# Patient Record
Sex: MALE | Race: WHITE | NOT HISPANIC OR LATINO | Employment: OTHER | URBAN - METROPOLITAN AREA
[De-identification: names, ages, dates, MRNs, and addresses within clinical notes are randomized per-mention and may not be internally consistent; named-entity substitution may affect disease eponyms.]

---

## 2020-02-26 ENCOUNTER — LAB REQUISITION (OUTPATIENT)
Dept: LAB | Facility: HOSPITAL | Age: 75
End: 2020-02-26
Payer: MEDICARE

## 2020-02-26 DIAGNOSIS — K22.70 BARRETT'S ESOPHAGUS WITHOUT DYSPLASIA: ICD-10-CM

## 2020-02-26 PROCEDURE — 88305 TISSUE EXAM BY PATHOLOGIST: CPT | Performed by: PATHOLOGY

## 2021-10-04 ENCOUNTER — OFFICE VISIT (OUTPATIENT)
Dept: URBAN - METROPOLITAN AREA CLINIC 48 | Facility: CLINIC | Age: 76
End: 2021-10-04

## 2021-10-04 ENCOUNTER — OFFICE VISIT (OUTPATIENT)
Dept: URBAN - METROPOLITAN AREA CLINIC 48 | Facility: CLINIC | Age: 76
End: 2021-10-04
Payer: MEDICARE

## 2021-10-04 ENCOUNTER — DASHBOARD ENCOUNTERS (OUTPATIENT)
Age: 76
End: 2021-10-04

## 2021-10-04 ENCOUNTER — LAB OUTSIDE AN ENCOUNTER (OUTPATIENT)
Dept: URBAN - METROPOLITAN AREA CLINIC 48 | Facility: CLINIC | Age: 76
End: 2021-10-04

## 2021-10-04 VITALS
SYSTOLIC BLOOD PRESSURE: 166 MMHG | TEMPERATURE: 97.9 F | HEIGHT: 71 IN | BODY MASS INDEX: 28 KG/M2 | HEART RATE: 57 BPM | DIASTOLIC BLOOD PRESSURE: 77 MMHG | WEIGHT: 200 LBS

## 2021-10-04 DIAGNOSIS — R14.3 FLATULENCE: ICD-10-CM

## 2021-10-04 DIAGNOSIS — R13.13 PHARYNGEAL DYSPHAGIA: ICD-10-CM

## 2021-10-04 DIAGNOSIS — Z12.11 COLON CANCER SCREENING: ICD-10-CM

## 2021-10-04 PROBLEM — 21101000119105: Status: ACTIVE | Noted: 2021-10-04

## 2021-10-04 PROCEDURE — 99204 OFFICE O/P NEW MOD 45 MIN: CPT | Performed by: NURSE PRACTITIONER

## 2021-10-04 RX ORDER — LOSARTAN POTASSIUM AND HYDROCHLOROTHIAZIDE 100; 12.5 MG/1; MG/1
1 TABLET TABLET, FILM COATED ORAL ONCE A DAY
Status: ACTIVE | COMMUNITY

## 2021-10-04 RX ORDER — TRAZODONE HYDROCHLORIDE 50 MG/1
1 TABLET AT BEDTIME AS NEEDED TABLET, FILM COATED ORAL ONCE A DAY
Status: ACTIVE | COMMUNITY

## 2021-10-04 RX ORDER — FENOFIBRATE 160 MG/1
1 TABLET TABLET ORAL ONCE A DAY
Status: ACTIVE | COMMUNITY

## 2021-10-04 RX ORDER — ALLOPURINOL 300 MG/1
1 TABLET TABLET ORAL ONCE A DAY
Status: ACTIVE | COMMUNITY

## 2021-10-04 RX ORDER — HYDRALAZINE HYDROCHLORIDE 25 MG/1
1 TABLET WITH FOOD TABLET, FILM COATED ORAL THREE TIMES A DAY
Status: ACTIVE | COMMUNITY

## 2021-10-04 RX ORDER — METOPROLOL TARTRATE 25 MG/1
1 TABLET WITH FOOD TABLET, FILM COATED ORAL TWICE A DAY
Status: ACTIVE | COMMUNITY

## 2021-10-04 RX ORDER — OMEPRAZOLE 20 MG/1
1 CAPSULE 30 MINUTES BEFORE MORNING MEAL CAPSULE, DELAYED RELEASE ORAL ONCE A DAY
Status: ACTIVE | COMMUNITY

## 2021-10-04 RX ORDER — FLUOXETINE HYDROCHLORIDE 20 MG/1
1 CAPSULE CAPSULE ORAL ONCE A DAY
Status: ACTIVE | COMMUNITY

## 2021-10-04 NOTE — HPI-TODAY'S VISIT:
The patient was referred by Dr. Pascual for evaluation of dysphagia .  A copy of this document is being forwarded to the referring provider. The patient has had 20 years of intermittent dysphagia to solids. Denies abdominal or chest pain. At the time of choking, he has to stop and rest and try to drink water. He often cannot get water down. Toast and rice are the worst, He has had the himleck manuever once. The patient denies GERD but his PCP started him on Omeprazole 20mg. He has not noted any improvement.  Notes having increased gas in the last few years. Bowel movements are daily of soft, formed stool 3 times a day but denies blood in stool. His last colonoscopy was in 1976 and he was told it was normal. He is due for an US of the kidneys due to decrease kidney function.

## 2021-10-19 ENCOUNTER — CLAIMS CREATED FROM THE CLAIM WINDOW (OUTPATIENT)
Dept: URBAN - METROPOLITAN AREA SURGERY CENTER 27 | Facility: SURGERY CENTER | Age: 76
End: 2021-10-19
Payer: MEDICARE

## 2021-10-19 DIAGNOSIS — Q39.9 CONGENITAL MALFORMATION OF ESOPHAGUS, UNSPECIFIED: ICD-10-CM

## 2021-10-19 DIAGNOSIS — R13.10 ABNORMAL DEGLUTITION: ICD-10-CM

## 2021-10-19 PROCEDURE — G8907 PT DOC NO EVENTS ON DISCHARG: HCPCS | Performed by: INTERNAL MEDICINE

## 2021-10-19 PROCEDURE — 43248 EGD GUIDE WIRE INSERTION: CPT | Performed by: INTERNAL MEDICINE

## 2021-10-19 RX ORDER — LOSARTAN POTASSIUM AND HYDROCHLOROTHIAZIDE 100; 12.5 MG/1; MG/1
1 TABLET TABLET, FILM COATED ORAL ONCE A DAY
Status: ACTIVE | COMMUNITY

## 2021-10-19 RX ORDER — FENOFIBRATE 160 MG/1
1 TABLET TABLET ORAL ONCE A DAY
Status: ACTIVE | COMMUNITY

## 2021-10-19 RX ORDER — ALLOPURINOL 300 MG/1
1 TABLET TABLET ORAL ONCE A DAY
Status: ACTIVE | COMMUNITY

## 2021-10-19 RX ORDER — METOPROLOL TARTRATE 25 MG/1
1 TABLET WITH FOOD TABLET, FILM COATED ORAL TWICE A DAY
Status: ACTIVE | COMMUNITY

## 2021-10-19 RX ORDER — TRAZODONE HYDROCHLORIDE 50 MG/1
1 TABLET AT BEDTIME AS NEEDED TABLET, FILM COATED ORAL ONCE A DAY
Status: ACTIVE | COMMUNITY

## 2021-10-19 RX ORDER — OMEPRAZOLE 20 MG/1
1 CAPSULE 30 MINUTES BEFORE MORNING MEAL CAPSULE, DELAYED RELEASE ORAL ONCE A DAY
Status: ACTIVE | COMMUNITY

## 2021-10-19 RX ORDER — FLUOXETINE HYDROCHLORIDE 20 MG/1
1 CAPSULE CAPSULE ORAL ONCE A DAY
Status: ACTIVE | COMMUNITY

## 2021-10-19 RX ORDER — HYDRALAZINE HYDROCHLORIDE 25 MG/1
1 TABLET WITH FOOD TABLET, FILM COATED ORAL THREE TIMES A DAY
Status: ACTIVE | COMMUNITY

## 2022-10-18 PROCEDURE — 88305 TISSUE EXAM BY PATHOLOGIST: CPT | Performed by: PATHOLOGY

## 2022-10-19 ENCOUNTER — LAB REQUISITION (OUTPATIENT)
Dept: LAB | Facility: HOSPITAL | Age: 77
End: 2022-10-19
Payer: MEDICARE

## 2022-10-19 DIAGNOSIS — K22.70 BARRETT'S ESOPHAGUS WITHOUT DYSPLASIA: ICD-10-CM

## 2022-10-19 DIAGNOSIS — K31.7 POLYP OF STOMACH AND DUODENUM: ICD-10-CM

## 2022-10-27 PROCEDURE — 88305 TISSUE EXAM BY PATHOLOGIST: CPT | Performed by: PATHOLOGY

## 2024-12-10 PROBLEM — E78.5 DYSLIPIDEMIA: Status: ACTIVE | Noted: 2018-09-21

## 2024-12-10 PROBLEM — I48.0 PAROXYSMAL ATRIAL FIBRILLATION (HCC): Status: ACTIVE | Noted: 2018-10-30

## 2024-12-10 PROBLEM — H40.9 GLAUCOMA: Status: ACTIVE | Noted: 2024-12-10

## 2024-12-10 PROBLEM — Z86.0100 HISTORY OF COLONIC POLYPS: Status: ACTIVE | Noted: 2024-12-10

## 2024-12-10 PROBLEM — E78.5 HYPERLIPIDEMIA: Status: ACTIVE | Noted: 2024-12-10

## 2024-12-10 PROBLEM — K22.70 BARRETT'S ESOPHAGUS: Status: ACTIVE | Noted: 2024-12-10

## 2025-05-28 ENCOUNTER — APPOINTMENT (EMERGENCY)
Dept: RADIOLOGY | Facility: HOSPITAL | Age: 80
End: 2025-05-28
Payer: MEDICARE

## 2025-05-28 ENCOUNTER — HOSPITAL ENCOUNTER (EMERGENCY)
Facility: HOSPITAL | Age: 80
Discharge: HOME | End: 2025-05-28
Attending: EMERGENCY MEDICINE | Admitting: EMERGENCY MEDICINE
Payer: MEDICARE

## 2025-05-28 ENCOUNTER — RESULTS FOLLOW-UP (OUTPATIENT)
Dept: EMERGENCY DEPT | Facility: HOSPITAL | Age: 80
End: 2025-05-28

## 2025-05-28 VITALS
DIASTOLIC BLOOD PRESSURE: 73 MMHG | TEMPERATURE: 97.8 F | SYSTOLIC BLOOD PRESSURE: 162 MMHG | OXYGEN SATURATION: 98 % | HEART RATE: 52 BPM | RESPIRATION RATE: 18 BRPM | WEIGHT: 230.82 LBS | BODY MASS INDEX: 32.19 KG/M2

## 2025-05-28 DIAGNOSIS — R03.0 ELEVATED BLOOD PRESSURE READING: ICD-10-CM

## 2025-05-28 DIAGNOSIS — M25.512 LEFT SHOULDER PAIN: ICD-10-CM

## 2025-05-28 DIAGNOSIS — R07.9 CHEST PAIN: Primary | ICD-10-CM

## 2025-05-28 LAB
2HR DELTA HS TROPONIN: -1 NG/L
ALBUMIN SERPL BCG-MCNC: 4.4 G/DL (ref 3.5–5)
ALP SERPL-CCNC: 66 U/L (ref 34–104)
ALT SERPL W P-5'-P-CCNC: 23 U/L (ref 7–52)
ANION GAP SERPL CALCULATED.3IONS-SCNC: 5 MMOL/L (ref 4–13)
AST SERPL W P-5'-P-CCNC: 28 U/L (ref 13–39)
ATRIAL RATE: 52 BPM
ATRIAL RATE: 57 BPM
BASOPHILS # BLD AUTO: 0.02 THOUSANDS/ÂΜL (ref 0–0.1)
BASOPHILS NFR BLD AUTO: 0 % (ref 0–1)
BILIRUB SERPL-MCNC: 0.94 MG/DL (ref 0.2–1)
BUN SERPL-MCNC: 12 MG/DL (ref 5–25)
CALCIUM SERPL-MCNC: 9.3 MG/DL (ref 8.4–10.2)
CARDIAC TROPONIN I PNL SERPL HS: 5 NG/L (ref ?–50)
CARDIAC TROPONIN I PNL SERPL HS: 6 NG/L (ref ?–50)
CHLORIDE SERPL-SCNC: 104 MMOL/L (ref 96–108)
CO2 SERPL-SCNC: 27 MMOL/L (ref 21–32)
CREAT SERPL-MCNC: 0.83 MG/DL (ref 0.6–1.3)
D DIMER PPP FEU-MCNC: <0.27 UG/ML FEU
EOSINOPHIL # BLD AUTO: 0.48 THOUSAND/ÂΜL (ref 0–0.61)
EOSINOPHIL NFR BLD AUTO: 6 % (ref 0–6)
ERYTHROCYTE [DISTWIDTH] IN BLOOD BY AUTOMATED COUNT: 12.3 % (ref 11.6–15.1)
GFR SERPL CREATININE-BSD FRML MDRD: 83 ML/MIN/1.73SQ M
GLUCOSE SERPL-MCNC: 108 MG/DL (ref 65–140)
HCT VFR BLD AUTO: 45.4 % (ref 36.5–49.3)
HGB BLD-MCNC: 15.6 G/DL (ref 12–17)
IMM GRANULOCYTES # BLD AUTO: 0.02 THOUSAND/UL (ref 0–0.2)
IMM GRANULOCYTES NFR BLD AUTO: 0 % (ref 0–2)
LYMPHOCYTES # BLD AUTO: 3.06 THOUSANDS/ÂΜL (ref 0.6–4.47)
LYMPHOCYTES NFR BLD AUTO: 41 % (ref 14–44)
MCH RBC QN AUTO: 30.6 PG (ref 26.8–34.3)
MCHC RBC AUTO-ENTMCNC: 34.4 G/DL (ref 31.4–37.4)
MCV RBC AUTO: 89 FL (ref 82–98)
MONOCYTES # BLD AUTO: 0.7 THOUSAND/ÂΜL (ref 0.17–1.22)
MONOCYTES NFR BLD AUTO: 9 % (ref 4–12)
NEUTROPHILS # BLD AUTO: 3.26 THOUSANDS/ÂΜL (ref 1.85–7.62)
NEUTS SEG NFR BLD AUTO: 44 % (ref 43–75)
NRBC BLD AUTO-RTO: 0 /100 WBCS
P AXIS: 54 DEGREES
P AXIS: 54 DEGREES
PLATELET # BLD AUTO: 206 THOUSANDS/UL (ref 149–390)
PMV BLD AUTO: 10.2 FL (ref 8.9–12.7)
POTASSIUM SERPL-SCNC: 4.3 MMOL/L (ref 3.5–5.3)
PR INTERVAL: 214 MS
PR INTERVAL: 218 MS
PROT SERPL-MCNC: 7.2 G/DL (ref 6.4–8.4)
QRS AXIS: -46 DEGREES
QRS AXIS: -50 DEGREES
QRSD INTERVAL: 96 MS
QRSD INTERVAL: 96 MS
QT INTERVAL: 418 MS
QT INTERVAL: 448 MS
QTC INTERVAL: 406 MS
QTC INTERVAL: 416 MS
RBC # BLD AUTO: 5.1 MILLION/UL (ref 3.88–5.62)
SODIUM SERPL-SCNC: 136 MMOL/L (ref 135–147)
T WAVE AXIS: 60 DEGREES
T WAVE AXIS: 66 DEGREES
VENTRICULAR RATE: 52 BPM
VENTRICULAR RATE: 57 BPM
WBC # BLD AUTO: 7.54 THOUSAND/UL (ref 4.31–10.16)

## 2025-05-28 PROCEDURE — 73030 X-RAY EXAM OF SHOULDER: CPT

## 2025-05-28 PROCEDURE — 85379 FIBRIN DEGRADATION QUANT: CPT | Performed by: EMERGENCY MEDICINE

## 2025-05-28 PROCEDURE — 93005 ELECTROCARDIOGRAM TRACING: CPT

## 2025-05-28 PROCEDURE — 71046 X-RAY EXAM CHEST 2 VIEWS: CPT

## 2025-05-28 PROCEDURE — 80053 COMPREHEN METABOLIC PANEL: CPT | Performed by: EMERGENCY MEDICINE

## 2025-05-28 PROCEDURE — 36415 COLL VENOUS BLD VENIPUNCTURE: CPT | Performed by: EMERGENCY MEDICINE

## 2025-05-28 PROCEDURE — 85025 COMPLETE CBC W/AUTO DIFF WBC: CPT | Performed by: EMERGENCY MEDICINE

## 2025-05-28 PROCEDURE — 84484 ASSAY OF TROPONIN QUANT: CPT | Performed by: EMERGENCY MEDICINE

## 2025-05-28 PROCEDURE — 93010 ELECTROCARDIOGRAM REPORT: CPT | Performed by: INTERNAL MEDICINE

## 2025-05-28 PROCEDURE — 99285 EMERGENCY DEPT VISIT HI MDM: CPT

## 2025-05-28 PROCEDURE — 99285 EMERGENCY DEPT VISIT HI MDM: CPT | Performed by: EMERGENCY MEDICINE

## 2025-05-28 NOTE — DISCHARGE INSTRUCTIONS
Follow up with family doctor    Follow up with Cardiology.   Call [facility] if you would like to switch Cardiology groups    Return to the ED if you have any new/worsening symptoms including but not limited to worsening pain, difficulty breathing, lightheadedness, passing out, etc.

## 2025-05-28 NOTE — ED PROVIDER NOTES
Time reflects when diagnosis was documented in both MDM as applicable and the Disposition within this note       Time User Action Codes Description Comment    5/28/2025  1:08 PM Wei Jaleesa FLACO Add [R07.9] Chest pain     5/28/2025  1:08 PM Wei Jaleesa FLACO Add [R03.0] Elevated blood pressure reading     5/28/2025  1:16 PM Jaleesa Carvajal Add [M25.512] Left shoulder pain           ED Disposition       ED Disposition   Discharge    Condition   Stable    Date/Time   Wed May 28, 2025  1:08 PM    Comment   Barron Munoz discharge to home/self care.                   Assessment & Plan       Medical Decision Making  80 yo M with CP/L shoulder pain- EKG to r/o STEMI/dysrhythmia/abn intervals/ischemic changes, troponin to eval for ischemia, CBC to assess for leukocytosis/anemia, CMP to assess for elevated LFTs/GRIFFIN/electrolyte derangements, CXR to r/o PTX/PNA/effusion/CHF     ER workup unrevealing  Discussed with pt to f/u with PCP and Cardiology for further workup    Close return precautions discussed and pt expressed comfort and understanding with plan     Problems Addressed:  Chest pain: acute illness or injury  Elevated blood pressure reading: acute illness or injury  Left shoulder pain: acute illness or injury    Amount and/or Complexity of Data Reviewed  External Data Reviewed: labs, radiology, ECG and notes.  Labs: ordered. Decision-making details documented in ED Course.  Radiology: ordered and independent interpretation performed. Decision-making details documented in ED Course.  ECG/medicine tests: ordered and independent interpretation performed. Decision-making details documented in ED Course.        ED Course as of 05/28/25 1532   Wed May 28, 2025   1056 EKG independently interpreted by myself:  1st degree AV block , 57 bpm, LAD, qtc 406, nonspecific st changes, slight twi/flattening avL, twi v1, no prior for comparison   1119 CXR independently interpreted by myself: no acute abn    1120 L shoulder x-ray  independently interpreted by myself: no acute osseous abn, pending radiologist read   1212 hs TnI 0hr: 6   1251 D-Dimer, Quant: <0.27   1308 Pt/wife updated regarding results. Agreeable to delta troponin   1337 Delta 2hr hsTnI: -1       Medications - No data to display    ED Risk Strat Scores   HEART Risk Score      Flowsheet Row Most Recent Value   Heart Score Risk Calculator    History 1 Filed at: 05/28/2025 1221   ECG 1 Filed at: 05/28/2025 1221   Age 2 Filed at: 05/28/2025 1221   Risk Factors 1 Filed at: 05/28/2025 1221   Troponin 0 Filed at: 05/28/2025 1221   HEART Score 5 Filed at: 05/28/2025 1221          HEART Risk Score      Flowsheet Row Most Recent Value   Heart Score Risk Calculator    History 1 Filed at: 05/28/2025 1221   ECG 1 Filed at: 05/28/2025 1221   Age 2 Filed at: 05/28/2025 1221   Risk Factors 1 Filed at: 05/28/2025 1221   Troponin 0 Filed at: 05/28/2025 1221   HEART Score 5 Filed at: 05/28/2025 1221                      No data recorded        SBIRT 20yo+      Flowsheet Row Most Recent Value   Initial Alcohol Screen: US AUDIT-C     1. How often do you have a drink containing alcohol? 0 Filed at: 05/28/2025 1118   2. How many drinks containing alcohol do you have on a typical day you are drinking?  0 Filed at: 05/28/2025 1118   3a. Male UNDER 65: How often do you have five or more drinks on one occasion? 0 Filed at: 05/28/2025 1118   3b. FEMALE Any Age, or MALE 65+: How often do you have 4 or more drinks on one occassion? 0 Filed at: 05/28/2025 1118   Audit-C Score 0 Filed at: 05/28/2025 1118   YESIKA: How many times in the past year have you...    Used an illegal drug or used a prescription medication for non-medical reasons? Never Filed at: 05/28/2025 1118            Wells' Criteria for PE      Flowsheet Row Most Recent Value   Wells' Criteria for PE    Clinical signs and symptoms of DVT 0 Filed at: 05/28/2025 1316   PE is primary diagnosis or equally likely 0 Filed at: 05/28/2025 1316   HR >100  0 Filed at: 05/28/2025 1316   Immobilization at least 3 days or Surgery in the previous 4 weeks 0 Filed at: 05/28/2025 1316   Previous, objectively diagnosed PE or DVT 0 Filed at: 05/28/2025 1316   Hemoptysis 0 Filed at: 05/28/2025 1316   Malignancy with treatment within 6 months or palliative 0 Filed at: 05/28/2025 1316   Wells' Criteria Total 0 Filed at: 05/28/2025 1316                        History of Present Illness       Chief Complaint   Patient presents with    Chest Pain     Pt reports weeks of intermittent chest pain that radiates into arm and into back.  Pt also reports he is unable to take deep respirations in .        Past Medical History[1]   Past Surgical History[2]   Family History[3]   Social History[4]   E-Cigarette/Vaping      E-Cigarette/Vaping Substances      I have reviewed and agree with the history as documented.     80 yo M R hand dominant; presenting for evaluation of L shoulder/chest pain.    Reports sx for the past week. Initially L shoulder, but then moved to L chest/upper back. No a/e factors. 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 Feels can't get deep breath.     Denies SOB, abdominal pain, N/V/D/C, leg pain/swelling  No known CAD  No history of VTE        Per independent review of external records:   - 1/18/24 [facility]  - 1/19/24 ECHO: 60-65%    Review of Systems        Objective       ED Triage Vitals   Temperature Pulse Blood Pressure Respirations SpO2 Patient Position - Orthostatic VS   05/28/25 1036 05/28/25 1034 05/28/25 1034 05/28/25 1034 05/28/25 1034 05/28/25 1034   97.8 °F (36.6 °C) 62 (!) 180/80 20 97 % Lying      Temp Source Heart Rate Source BP Location FiO2 (%) Pain Score    05/28/25 1036 05/28/25 1034 05/28/25 1034 -- --    Oral Monitor Left arm        Vitals      Date and Time Temp Pulse SpO2 Resp BP Pain Score FACES Pain Rating User   05/28/25 1245 -- 52 98 % 18 162/73 -- -- DW   05/28/25 1236 -- 60 98 % 20  152/68 -- --    05/28/25 1036 97.8 °F (36.6 °C) -- -- -- -- -- --    05/28/25 1034 -- 62 97 % 20 180/80 -- --             Physical Exam  Vitals and nursing note reviewed.   Constitutional:       General: He is not in acute distress.     Appearance: Normal appearance. He is well-developed.   HENT:      Head: Normocephalic and atraumatic.      Nose: Nose normal.     Eyes:      Extraocular Movements: Extraocular movements intact.      Conjunctiva/sclera: Conjunctivae normal.       Cardiovascular:      Rate and Rhythm: Normal rate and regular rhythm.      Heart sounds: Normal heart sounds.      Comments: No skin changes/rash, no chest wall ttp  Pulmonary:      Effort: Pulmonary effort is normal. No respiratory distress.      Breath sounds: Normal breath sounds. No stridor. No wheezing or rales.   Chest:      Chest wall: No tenderness.   Abdominal:      General: There is no distension.      Palpations: Abdomen is soft.      Tenderness: There is no abdominal tenderness. There is no guarding or rebound.     Musculoskeletal:         General: No swelling, tenderness or deformity.      Cervical back: Normal range of motion and neck supple.      Comments: L shoulder: no swelling, no skin changes, full ROM, no ttp, distally NV intact. No calf swelling/ttp     Skin:     General: Skin is warm and dry.      Findings: No rash.     Neurological:      General: No focal deficit present.      Mental Status: He is alert and oriented to person, place, and time.      Motor: No abnormal muscle tone.      Coordination: Coordination normal.     Psychiatric:         Thought Content: Thought content normal.         Judgment: Judgment normal.         Results Reviewed       Procedure Component Value Units Date/Time    HS Troponin I 2hr [[id number]]  (Normal) Collected: 05/28/25 1307    Lab Status: Final result Specimen: Blood from Line, Venous Updated: 05/28/25 1333     hs TnI 2hr 5 ng/L      Delta 2hr hsTnI -1 ng/L     D-dimer,  quantitative [[id number]]  (Normal) Collected: 05/28/25 1117    Lab Status: Final result Specimen: Blood from Arm, Right Updated: 05/28/25 1243     D-Dimer, Quant <0.27 ug/ml FEU     Narrative:      In the evaluation for possible pulmonary embolism, in the appropriate (Well's Score of 4 or less) patient, the age adjusted d-dimer cutoff for this patient can be calculated as:    Age x 0.01 (in ug/mL) for Age-adjusted D-dimer exclusion threshold for a patient over 50 years.    HS Troponin I 4hr [[id number]]     Lab Status: No result Specimen: Blood     HS Troponin 0hr (reflex protocol) [[id number]]  (Normal) Collected: 05/28/25 1117    Lab Status: Final result Specimen: Blood from Arm, Right Updated: 05/28/25 1146     hs TnI 0hr 6 ng/L     Comprehensive metabolic panel [[id number]] Collected: 05/28/25 1117    Lab Status: Final result Specimen: Blood from Arm, Right Updated: 05/28/25 1140     Sodium 136 mmol/L      Potassium 4.3 mmol/L      Chloride 104 mmol/L      CO2 27 mmol/L      ANION GAP 5 mmol/L      BUN 12 mg/dL      Creatinine 0.83 mg/dL      Glucose 108 mg/dL      Calcium 9.3 mg/dL      AST 28 U/L      ALT 23 U/L      Alkaline Phosphatase 66 U/L      Total Protein 7.2 g/dL      Albumin 4.4 g/dL      Total Bilirubin 0.94 mg/dL      eGFR 83 ml/min/1.73sq m     Narrative:      National Kid[employer] Foundation guidelines for Chronic Kidney Disease (CKD):     Stage 1 with normal or high GFR (GFR > 90 mL/min/1.73 square meters)    Stage 2 Mild CKD (GFR = 60-89 mL/min/1.73 square meters)    Stage 3A Moderate CKD (GFR = 45-59 mL/min/1.73 square meters)    Stage 3B Moderate CKD (GFR = 30-44 mL/min/1.73 square meters)    Stage 4 Severe CKD (GFR = 15-29 mL/min/1.73 square meters)    Stage 5 End Stage CKD (GFR <15 mL/min/1.73 square meters)  Note: GFR calculation is accurate only with a steady state creatinine    CBC and differential [[id number]] Collected: 05/28/25 1117    Lab Status: Final result Specimen: Blood from Arm,  Right Updated: 05/28/25 1125     WBC 7.54 Thousand/uL      RBC 5.10 Million/uL      Hemoglobin 15.6 g/dL      Hematocrit 45.4 %      MCV 89 fL      MCH 30.6 pg      MCHC 34.4 g/dL      RDW 12.3 %      MPV 10.2 fL      Platelets 206 Thousands/uL      nRBC 0 /100 WBCs      Segmented % 44 %      Immature Grans % 0 %      Lymphocytes % 41 %      Monocytes % 9 %      Eosinophils Relative 6 %      Basophils Relative 0 %      Absolute Neutrophils 3.26 Thousands/µL      Absolute Immature Grans 0.02 Thousand/uL      Absolute Lymphocytes 3.06 Thousands/µL      Absolute Monocytes 0.70 Thousand/µL      Eosinophils Absolute 0.48 Thousand/µL      Basophils Absolute 0.02 Thousands/µL             XR chest 2 views   ED Interpretation by Jaleesa Carvajal DO (05/28 1311)   IMPRESSION:     No acute cardiopulmonary disease.        Final Interpretation by Anjelica Miller MD (05/28 1303)      No acute cardiopulmonary disease.            Workstation performed: [id number]         XR shoulder 2+ views LEFT   Final Interpretation by Kaden Novoa MD (05/28 0729)      Small area of indeterminate cortical erosion/irregularity along the lateral humeral neck margin which might be further characterized with CT or MRI to exclude any significant bone lesion or infection or other significant pathology.      The study was marked in EPIC for immediate notification.         Computerized Assisted Algorithm (CAA) may have been used to analyze all applicable images.         Workstation performed: [id number]             Procedures    ED Medication and Procedure Management   Prior to Admission Medications   Prescriptions Last Dose Informant Patient Reported? Taking?   ASHWAGANDHA PO Not Taking  Yes No   Sig: Take by mouth   Patient not taking: Reported on 5/28/2025   Multiple Vitamins-Minerals (ZINC PO) Not Taking  Yes No   Sig: Take by mouth   Patient not taking: Reported on 5/28/2025   Omega-3 Fatty Acids (FISH OIL PO) Not Taking  Yes No    Sig: Take by mouth   Patient not taking: Reported on 5/28/2025   VITAMIN D PO Not Taking  Yes No   Sig: Take by mouth   Patient not taking: Reported on 5/28/2025   VITAMIN E PO Not Taking  Yes No   Sig: Take by mouth   Patient not taking: Reported on 5/28/2025   latanoprost (XALATAN) 0.005 % ophthalmic solution Not Taking  Yes No   Patient not taking: Reported on 5/28/2025   pantoprazole (PROTONIX) 20 mg tablet   Yes Yes   Sig: Take 20 mg by mouth in the morning.   timolol (TIMOPTIC) 0.5 % ophthalmic solution   Yes Yes      Facility-Administered Medications: None     Discharge Medication List as of 5/28/2025  1:37 PM        CONTINUE these medications which have NOT CHANGED    Details   pantoprazole (PROTONIX) 20 mg tablet Take 20 mg by mouth in the morning., Starting Fri 10/18/2024, Historical Med      timolol (TIMOPTIC) 0.5 % ophthalmic solution Starting Fri 7/29/2022, Historical Med      ASHWAGANDHA PO Take by mouth, Historical Med      latanoprost (XALATAN) 0.005 % ophthalmic solution Starting Mon 8/8/2022, Historical Med      Multiple Vitamins-Minerals (ZINC PO) Take by mouth, Historical Med      Omega-3 Fatty Acids (FISH OIL PO) Take by mouth, Historical Med      VITAMIN D PO Take by mouth, Historical Med      VITAMIN E PO Take by mouth, Historical Med           No discharge procedures on file.  ED SEPSIS DOCUMENTATION   Time reflects when diagnosis was documented in both MDM as applicable and the Disposition within this note       Time User Action Codes Description Comment    5/28/2025  1:08 PM Jaleesa Carvajal Add [R07.9] Chest pain     5/28/2025  1:08 PM Jaleesa Carvajal Add [R03.0] Elevated blood pressure reading     5/28/2025  1:16 PM Jaleesa Carvajal Add [M25.512] Left shoulder pain                      [1]   Past Medical History:  Diagnosis Date    Glaucoma    [2]   Past Surgical History:  Procedure Laterality Date    EGD  02/2016    Irregular Z line-biopsy positive for intestinal metaplasia negative for dysplasia  by Dr. 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 Angela    EGD  10/18/2022    Dr Angela    UPPER GASTROINTESTINAL ENDOSCOPY  02/2015    Mild esophagitis, duodenal nodularity, biopsy esophagus positive for intestinal metaplasia/Robledo's, Negative for dysplasia by Dr. Whitten   [3] No family history on file.  [4]   Social History  Tobacco Use    Smoking status: Never    Smokeless tobacco: Never   Substance Use Topics    Alcohol use: Yes     Comment: occassional/social        Jaleesa Carvajal DO  05/28/25 [id number]